# Patient Record
Sex: FEMALE | Race: WHITE | Employment: OTHER | URBAN - METROPOLITAN AREA
[De-identification: names, ages, dates, MRNs, and addresses within clinical notes are randomized per-mention and may not be internally consistent; named-entity substitution may affect disease eponyms.]

---

## 2021-05-25 ENCOUNTER — RECORDS - HEALTHEAST (OUTPATIENT)
Dept: ADMINISTRATIVE | Facility: CLINIC | Age: 81
End: 2021-05-25

## 2022-12-02 ENCOUNTER — TRANSCRIBE ORDERS (OUTPATIENT)
Dept: VASCULAR SURGERY | Facility: CLINIC | Age: 82
End: 2022-12-02

## 2022-12-02 DIAGNOSIS — L97.921 NON-PRESSURE CHRONIC ULCER OF LEFT LOWER LEG, LIMITED TO BREAKDOWN OF SKIN (H): Primary | ICD-10-CM

## 2024-01-03 ENCOUNTER — DOCUMENTATION ONLY (OUTPATIENT)
Dept: GERIATRICS | Facility: CLINIC | Age: 84
End: 2024-01-03
Payer: COMMERCIAL

## 2024-01-03 PROBLEM — E53.8 B12 DEFICIENCY: Status: ACTIVE | Noted: 2020-10-22

## 2024-01-03 PROBLEM — I27.82 OTHER CHRONIC PULMONARY EMBOLISM WITH ACUTE COR PULMONALE (H): Status: ACTIVE | Noted: 2023-01-30

## 2024-01-03 PROBLEM — Z87.2 HISTORY OF ACTINIC KERATOSES: Status: ACTIVE | Noted: 2024-01-03

## 2024-01-03 PROBLEM — I49.9 CARDIAC ARRHYTHMIA: Status: ACTIVE | Noted: 2024-01-03

## 2024-01-03 PROBLEM — R13.10 DYSPHAGIA: Status: ACTIVE | Noted: 2024-01-03

## 2024-01-03 PROBLEM — R06.09 DOE (DYSPNEA ON EXERTION): Status: ACTIVE | Noted: 2023-03-08

## 2024-01-03 PROBLEM — M81.0 OSTEOPOROSIS: Status: ACTIVE | Noted: 2024-01-03

## 2024-01-03 PROBLEM — H18.459: Status: ACTIVE | Noted: 2018-09-19

## 2024-01-03 PROBLEM — I26.09 OTHER CHRONIC PULMONARY EMBOLISM WITH ACUTE COR PULMONALE (H): Status: ACTIVE | Noted: 2023-01-30

## 2024-01-03 PROBLEM — L71.9 ROSACEA: Status: ACTIVE | Noted: 2024-01-03

## 2024-01-03 PROBLEM — R41.89 COGNITIVE IMPAIRMENT: Status: ACTIVE | Noted: 2023-12-27

## 2024-01-03 PROBLEM — F16.988: Status: ACTIVE | Noted: 2023-12-27

## 2024-01-03 PROBLEM — I26.99 PULMONARY EMBOLISM (H): Status: ACTIVE | Noted: 2022-12-12

## 2024-01-03 PROBLEM — G60.8 PERIPHERAL SENSORY NEUROPATHY: Status: ACTIVE | Noted: 2017-09-13

## 2024-01-04 ENCOUNTER — TRANSITIONAL CARE UNIT VISIT (OUTPATIENT)
Dept: GERIATRICS | Facility: CLINIC | Age: 84
End: 2024-01-04
Payer: COMMERCIAL

## 2024-01-04 DIAGNOSIS — S12.390S: ICD-10-CM

## 2024-01-04 DIAGNOSIS — S22.41XD CLOSED FRACTURE OF MULTIPLE RIBS OF RIGHT SIDE WITH ROUTINE HEALING, SUBSEQUENT ENCOUNTER: ICD-10-CM

## 2024-01-04 DIAGNOSIS — F03.90 DEMENTIA WITHOUT BEHAVIORAL DISTURBANCE (H): ICD-10-CM

## 2024-01-04 DIAGNOSIS — Z86.711 HISTORY OF PULMONARY EMBOLISM: ICD-10-CM

## 2024-01-04 DIAGNOSIS — R13.10 DYSPHAGIA, UNSPECIFIED TYPE: ICD-10-CM

## 2024-01-04 DIAGNOSIS — S32.010S CLOSED COMPRESSION FRACTURE OF L1 LUMBAR VERTEBRA, SEQUELA: Primary | ICD-10-CM

## 2024-01-04 PROCEDURE — 99309 SBSQ NF CARE MODERATE MDM 30: CPT | Performed by: NURSE PRACTITIONER

## 2024-01-04 NOTE — LETTER
1/4/2024        RE: Balbina Fischer  [address]  [facility] [address]        [facility]    Code Status:  DNR/DNI   Visit Type:   Chief Complaint   Patient presents with     TCU Admission     Facility:  [facility] ([facility]) [[id number]]         HPI: Balbina Fischer is a 83 year old female who I am seeing today for admit to the TCU. Pt recently hospitalized at [facility] on 12/27/23- 1/3/24.  Past medical history includes PE on apixaban, dementia, urinary continence, osteoporosis, dysphagia, hyperlipidemia with recurrent falls.  Pt admitted status post mechanical fall.  Patient had a ground-level fall outside her primary care office.  She was walking out of the clinic with her  when she fell and hit her head on the wall.  She endorsed back and neck pain.  Patient found to have an acute L1 burst fracture with mild height loss 4 mm retropulsion.  She had a right parietal scalp with swelling with no fracture.  Bilateral rib fractures likely subacute with age-indeterminate right C4 spinous process fracture. Patient with multiple recurrent falls at home.    Transitional Care Course: Today patient lying in bed.  Her  and granddaughter present on exam.  Her  is her primary care giver at home.   reports approximately 30 falls over the last year.  Patient seen by neurosurgery and was placed in [place] collar and [place] brace with spinal precautions.  Incidental finding of no distal esophageal wall thickening seen with esophagitis.  Patient is seen by speech therapy and placed on honey thickened liquids.  Patient complaining of pain in her back writhing in bed.  She is only on Tylenol.  I did discuss with the family scheduling this and they are in agreement.  She does have some underlying cognitive deficit.  Patient denies any shortness of breath or chest pain.  Family reports she is eating well.  She is having regular bowel movements and  voiding.      Assessment/Plan:       Closed compression fracture of L1 lumbar vertebra, sequela  Closed fracture of multiple ribs of right side with routine healing, subsequent encounter  -splinting OOB.   -Increase tylenol to 1000 mg TID.   -Follow up with Neurosurgery in 6 weeks with repeat imaging.     Other closed displaced fracture of fourth cervical vertebra, sequela  -age indeterminate.     Dementia without behavioral disturbance (H)  -continue namenda.     Dysphagia, unspecified type  -OK to ST to eval.  -Regular diet with honey thick liquids.   -incidental diagnosis of esophageal wall thickening.  Recommended barium swallow outpatient.    History of pulmonary embolism  -Continue apixaban.    -Okay for PT OT and speech to eval and treat.  -Follow-up CBC and BMP.      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     Dysphagia 01/03/2024     ESTRADA (dyspnea on exertion) 03/08/2023     Osteoporosis 01/03/2024     Macular pucker, left eye 08/05/2013     Hyperlipidemia 09/19/2014     Pulmonary embolism (H) 12/12/2022     Other chronic pulmonary embolism with acute cor pulmonale (H) 01/30/2023     Peripheral sensory neuropathy 09/13/2017     Paresthesia 05/13/2014     Right sided sciatica 08/05/2013     Rosacea 01/03/2024     Resolved Ambulatory Problems     Diagnosis Date Noted     No Resolved Ambulatory Problems     No Additional Past Medical History     Allergies   Allergen Reactions     Ibandronic Acid Other (See Comments)     Jaw osteonecrosis       All Meds and Allergies reviewed in the record at the facility and is the most up-to-date.    Post Discharge Medication Reconciliation Status: discharge medications reconciled and  changed, per note/orders  Current Outpatient Medications   Medication Sig     acetaminophen (TYLENOL) 500 MG tablet Take 1,000 mg by mouth 3 times daily     apixaban ANTICOAGULANT (ELIQUIS) 5 MG tablet Take 10 mg by mouth 2 times daily 10 mg BID X 12 doses, then 5 mg BID X 3 months.     melatonin 3 MG tablet Take 3 mg by mouth at bedtime     memantine (NAMENDA) 5 MG tablet Take 5 mg by mouth 2 times daily     No current facility-administered medications for this visit.       REVIEW OF SYSTEMS:   10 point review of systems reviewed and pertinent positives in the HPI.     PHYSICAL EXAMINATION:  Physical Exam     Vital signs: /64, pulse 79, respirations 18, temperature 98.4, O2 sat 95% on room air.    General: Awake, Alert, oriented x3, lying in bed follows simple commands at times.  HEENT: Keeps eyes closed and opens twice during visit.   NECK: Supple  CVS:  S1  S2, without murmur or gallop.   LUNG: Clear to auscultation, No wheezes, rales or rhonci.  BACK: No kyphosis of the thoracic spine.  Splint on.  ABDOMEN: Soft, thin, with positive bowel sounds  EXTREMITIES: Curled in bed.  No lower extremity edema.  SKIN: Warm and dry  NEUROLOGIC: Intact, pulses palpable  PSYCHIATRIC: Cognitive impairment noted.    35 minutes of this visit which included.  For the visit, reviewing neurosurgery consult, imaging, labs and medications as well as face-to-face time spent with patient and her family.      This note has been dictated using voice recognition software. Any grammatical or context distortions are unintentional and inherent to the software    Electronically signed by: Nicki Calderon CNP       Sincerely,        Nicki Calderon NP

## 2024-01-05 RX ORDER — MEMANTINE HYDROCHLORIDE 5 MG/1
5 TABLET ORAL 2 TIMES DAILY
COMMUNITY

## 2024-01-05 RX ORDER — ACETAMINOPHEN 500 MG
1000 TABLET ORAL 3 TIMES DAILY
COMMUNITY

## 2024-01-05 RX ORDER — LANOLIN ALCOHOL/MO/W.PET/CERES
3 CREAM (GRAM) TOPICAL AT BEDTIME
COMMUNITY

## 2024-01-05 NOTE — PROGRESS NOTES
[facility]    Code Status:  DNR/DNI   Visit Type:   Chief Complaint   Patient presents with    TCU Admission     Facility:  [facility] ([facility]) [[id number]]         HPI: Balbina Fischer is a 83 year old female who I am seeing today for admit to the TCU. Pt recently hospitalized at [facility] on 12/27/23- 1/3/24.  Past medical history includes PE on apixaban, dementia, urinary continence, osteoporosis, dysphagia, hyperlipidemia with recurrent falls.  Pt admitted status post mechanical fall.  Patient had a ground-level fall outside her primary care office.  She was walking out of the clinic with her  when she fell and hit her head on the wall.  She endorsed back and neck pain.  Patient found to have an acute L1 burst fracture with mild height loss 4 mm retropulsion.  She had a right parietal scalp with swelling with no fracture.  Bilateral rib fractures likely subacute with age-indeterminate right C4 spinous process fracture. Patient with multiple recurrent falls at home.    Transitional Care Course: Today patient lying in bed.  Her  and granddaughter present on exam.  Her  is her primary care giver at home.   reports approximately 30 falls over the last year.  Patient seen by neurosurgery and was placed in [place] collar and [place] brace with spinal precautions.  Incidental finding of no distal esophageal wall thickening seen with esophagitis.  Patient is seen by speech therapy and placed on honey thickened liquids.  Patient complaining of pain in her back writhing in bed.  She is only on Tylenol.  I did discuss with the family scheduling this and they are in agreement.  She does have some underlying cognitive deficit.  Patient denies any shortness of breath or chest pain.  Family reports she is eating well.  She is having regular bowel movements and voiding.      Assessment/Plan:       Closed compression fracture of L1 lumbar vertebra, sequela  Closed fracture  of multiple ribs of right side with routine healing, subsequent encounter  -splinting OOB.   -Increase tylenol to 1000 mg TID.   -Follow up with Neurosurgery in 6 weeks with repeat imaging.     Other closed displaced fracture of fourth cervical vertebra, sequela  -age indeterminate.     Dementia without behavioral disturbance (H)  -continue namenda.     Dysphagia, unspecified type  -OK to ST to eval.  -Regular diet with honey thick liquids.   -incidental diagnosis of esophageal wall thickening.  Recommended barium swallow outpatient.    History of pulmonary embolism  -Continue apixaban.    -Okay for PT OT and speech to eval and treat.  -Follow-up CBC and BMP.      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    Dysphagia 01/03/2024    ESTRADA (dyspnea on exertion) 03/08/2023    Osteoporosis 01/03/2024    Macular pucker, left eye 08/05/2013    Hyperlipidemia 09/19/2014    Pulmonary embolism (H) 12/12/2022    Other chronic pulmonary embolism with acute cor pulmonale (H) 01/30/2023    Peripheral sensory neuropathy 09/13/2017    Paresthesia 05/13/2014    Right sided sciatica 08/05/2013    Rosacea 01/03/2024     Resolved Ambulatory Problems     Diagnosis Date Noted    No Resolved Ambulatory Problems     No Additional Past Medical History     Allergies   Allergen Reactions    Ibandronic Acid Other (See Comments)     Jaw osteonecrosis       All Meds and Allergies reviewed in the record at the facility and is the most up-to-date.    Post Discharge Medication Reconciliation Status: discharge medications reconciled and changed, per note/orders  Current Outpatient Medications   Medication Sig    acetaminophen (TYLENOL) 500 MG tablet Take 1,000 mg by  mouth 3 times daily    apixaban ANTICOAGULANT (ELIQUIS) 5 MG tablet Take 10 mg by mouth 2 times daily 10 mg BID X 12 doses, then 5 mg BID X 3 months.    melatonin 3 MG tablet Take 3 mg by mouth at bedtime    memantine (NAMENDA) 5 MG tablet Take 5 mg by mouth 2 times daily     No current facility-administered medications for this visit.       REVIEW OF SYSTEMS:   10 point review of systems reviewed and pertinent positives in the HPI.     PHYSICAL EXAMINATION:  Physical Exam     Vital signs: /64, pulse 79, respirations 18, temperature 98.4, O2 sat 95% on room air.    General: Awake, Alert, oriented x3, lying in bed follows simple commands at times.  HEENT: Keeps eyes closed and opens twice during visit.   NECK: Supple  CVS:  S1  S2, without murmur or gallop.   LUNG: Clear to auscultation, No wheezes, rales or rhonci.  BACK: No kyphosis of the thoracic spine.  Splint on.  ABDOMEN: Soft, thin, with positive bowel sounds  EXTREMITIES: Curled in bed.  No lower extremity edema.  SKIN: Warm and dry  NEUROLOGIC: Intact, pulses palpable  PSYCHIATRIC: Cognitive impairment noted.    35 minutes of this visit which included.  For the visit, reviewing neurosurgery consult, imaging, labs and medications as well as face-to-face time spent with patient and her family.      This note has been dictated using voice recognition software. Any grammatical or context distortions are unintentional and inherent to the software    Electronically signed by: Nicki Calderon CNP

## 2024-01-06 ENCOUNTER — LAB REQUISITION (OUTPATIENT)
Dept: LAB | Facility: CLINIC | Age: 84
End: 2024-01-06
Payer: COMMERCIAL

## 2024-01-06 DIAGNOSIS — S32.010D WEDGE COMPRESSION FRACTURE OF FIRST LUMBAR VERTEBRA, SUBSEQUENT ENCOUNTER FOR FRACTURE WITH ROUTINE HEALING: ICD-10-CM

## 2024-01-08 LAB
ANION GAP SERPL CALCULATED.3IONS-SCNC: 10 MMOL/L (ref 7–15)
BUN SERPL-MCNC: 19.5 MG/DL (ref 8–23)
CALCIUM SERPL-MCNC: 9.2 MG/DL (ref 8.8–10.2)
CHLORIDE SERPL-SCNC: 106 MMOL/L (ref 98–107)
CREAT SERPL-MCNC: 0.76 MG/DL (ref 0.51–0.95)
DEPRECATED HCO3 PLAS-SCNC: 26 MMOL/L (ref 22–29)
EGFRCR SERPLBLD CKD-EPI 2021: 78 ML/MIN/1.73M2
ERYTHROCYTE [DISTWIDTH] IN BLOOD BY AUTOMATED COUNT: 14.2 % (ref 10–15)
GLUCOSE SERPL-MCNC: 89 MG/DL (ref 70–99)
HCT VFR BLD AUTO: 34.4 % (ref 35–47)
HGB BLD-MCNC: 10.6 G/DL (ref 11.7–15.7)
MCH RBC QN AUTO: 29.9 PG (ref 26.5–33)
MCHC RBC AUTO-ENTMCNC: 30.8 G/DL (ref 31.5–36.5)
MCV RBC AUTO: 97 FL (ref 78–100)
PLATELET # BLD AUTO: 413 10E3/UL (ref 150–450)
POTASSIUM SERPL-SCNC: 4.5 MMOL/L (ref 3.4–5.3)
RBC # BLD AUTO: 3.54 10E6/UL (ref 3.8–5.2)
SODIUM SERPL-SCNC: 142 MMOL/L (ref 135–145)
WBC # BLD AUTO: 8.1 10E3/UL (ref 4–11)

## 2024-01-08 PROCEDURE — 80048 BASIC METABOLIC PNL TOTAL CA: CPT | Mod: ORL | Performed by: FAMILY MEDICINE

## 2024-01-08 PROCEDURE — 85027 COMPLETE CBC AUTOMATED: CPT | Mod: ORL | Performed by: FAMILY MEDICINE

## 2024-01-08 PROCEDURE — P9604 ONE-WAY ALLOW PRORATED TRIP: HCPCS | Mod: ORL | Performed by: FAMILY MEDICINE

## 2024-01-08 PROCEDURE — 36415 COLL VENOUS BLD VENIPUNCTURE: CPT | Mod: ORL | Performed by: FAMILY MEDICINE

## 2024-01-09 ENCOUNTER — TRANSITIONAL CARE UNIT VISIT (OUTPATIENT)
Dept: GERIATRICS | Facility: CLINIC | Age: 84
End: 2024-01-09
Payer: COMMERCIAL

## 2024-01-09 VITALS
HEIGHT: 61 IN | RESPIRATION RATE: 16 BRPM | DIASTOLIC BLOOD PRESSURE: 72 MMHG | OXYGEN SATURATION: 96 % | WEIGHT: 103 LBS | BODY MASS INDEX: 19.45 KG/M2 | SYSTOLIC BLOOD PRESSURE: 113 MMHG | TEMPERATURE: 98.2 F | HEART RATE: 96 BPM

## 2024-01-09 DIAGNOSIS — S22.41XD CLOSED FRACTURE OF MULTIPLE RIBS OF RIGHT SIDE WITH ROUTINE HEALING, SUBSEQUENT ENCOUNTER: ICD-10-CM

## 2024-01-09 DIAGNOSIS — F03.90 DEMENTIA WITHOUT BEHAVIORAL DISTURBANCE (H): ICD-10-CM

## 2024-01-09 DIAGNOSIS — S12.390S: ICD-10-CM

## 2024-01-09 DIAGNOSIS — S32.010S CLOSED COMPRESSION FRACTURE OF L1 LUMBAR VERTEBRA, SEQUELA: Primary | ICD-10-CM

## 2024-01-09 DIAGNOSIS — Z86.711 HISTORY OF PULMONARY EMBOLISM: ICD-10-CM

## 2024-01-09 PROCEDURE — 99309 SBSQ NF CARE MODERATE MDM 30: CPT | Performed by: NURSE PRACTITIONER

## 2024-01-09 NOTE — LETTER
1/9/2024        RE: Balbina Fischer  [address]  [facility] [address]        [facility]    Code Status:  DNR/DNI   Visit Type:   Chief Complaint   Patient presents with     TCU Follow Up     Facility:  [facility] ([facility]) [[id number]]         HPI: Balbina Fischer is a 83 year old female who I am seeing today for follow up on the TCU. Pt recently hospitalized at [facility] on 12/27/23- 1/3/24.  Past medical history includes PE on apixaban, dementia, urinary continence, osteoporosis, dysphagia, hyperlipidemia with recurrent falls.  Pt admitted status post mechanical fall.  Patient had a ground-level fall outside her primary care office.  She was walking out of the clinic with her  when she fell and hit her head on the wall.  She endorsed back and neck pain.  Patient found to have an acute L1 burst fracture with mild height loss 4 mm retropulsion.  She had a right parietal scalp with swelling with no fracture.  Bilateral rib fractures likely subacute with age-indeterminate right C4 spinous process fracture. Patient with multiple recurrent falls at home.    Transitional Care Course: Today patient lying in bed.  L1 burst fx. Patient continues in splinting. Pain controlled with tylenol. Dysphagia. Patient is seen by speech therapy and placed on honey thickened liquids.   Patient denies any shortness of breath or chest pain.  Family reports she is eating well.  She is having regular bowel movements and voiding.       Assessment/Plan:       Closed compression fracture of L1 lumbar vertebra, sequela  Closed fracture of multiple ribs of right side with routine healing, subsequent encounter  -splinting OOB.   -tylenol to 1000 mg TID.   -Follow up with Neurosurgery in 6 weeks with repeat imaging.     Other closed displaced fracture of fourth cervical vertebra, sequela  -age indeterminate.     Dementia without behavioral disturbance (H)  -continue namenda.     Dysphagia,  unspecified type  -ST following.   -Regular diet with honey thick liquids.   -incidental diagnosis of esophageal wall thickening.  Recommended barium swallow outpatient.    History of pulmonary embolism  -Continue apixaban.    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     Dysphagia 01/03/2024     ESTRADA (dyspnea on exertion) 03/08/2023     Osteoporosis 01/03/2024     Macular pucker, left eye 08/05/2013     Hyperlipidemia 09/19/2014     Pulmonary embolism (H) 12/12/2022     Other chronic pulmonary embolism with acute cor pulmonale (H) 01/30/2023     Peripheral sensory neuropathy 09/13/2017     Paresthesia 05/13/2014     Right sided sciatica 08/05/2013     Rosacea 01/03/2024     Resolved Ambulatory Problems     Diagnosis Date Noted     No Resolved Ambulatory Problems     No Additional Past Medical History     Allergies   Allergen Reactions     Ibandronic Acid Other (See Comments)     Jaw osteonecrosis       All Meds and Allergies reviewed in the record at the facility and is the most up-to-date.    Current Outpatient Medications   Medication Sig     acetaminophen (TYLENOL) 500 MG tablet Take 1,000 mg by mouth 3 times daily     apixaban ANTICOAGULANT (ELIQUIS) 5 MG tablet Take 10 mg by mouth 2 times daily 10 mg BID X 12 doses, then 5 mg BID X 3 months.     melatonin 3 MG tablet Take 3 mg by mouth at bedtime     memantine (NAMENDA) 5 MG tablet Take 5 mg by mouth 2 times daily     No current facility-administered medications for this visit.       REVIEW OF SYSTEMS:   10 point review of systems reviewed and pertinent positives in the HPI.     PHYSICAL EXAMINATION:  Physical Exam     Vital signs: /64, pulse 79, respirations 18,  temperature 98.4, O2 sat 95% on room air.    General: Awake, Alert, oriented x3, lying in bed follows simple commands at times.  HEENT: Awake, alert lying in be.   NECK: Supple  CVS:  S1  S2, without murmur or gallop.   LUNG: Shallow respiratory effort.   BACK: No kyphosis of the thoracic spine.  ABDOMEN: Soft, thin, with positive bowel sounds  EXTREMITIES: Moves all extremities with generalized weakness.  No lower extremity edema.  SKIN: Warm and dry  NEUROLOGIC: Intact  PSYCHIATRIC: Cognitive impairment noted.    This note has been dictated using voice recognition software. Any grammatical or context distortions are unintentional and inherent to the software    Electronically signed by: Nicki Calderon CNP       Sincerely,        Nicki Calderon NP

## 2024-01-10 NOTE — PROGRESS NOTES
[facility]    Code Status:  DNR/DNI   Visit Type:   Chief Complaint   Patient presents with    TCU Follow Up     Facility:  [facility] ([facility]) [[id number]]         HPI: Balbina Fischer is a 83 year old female who I am seeing today for follow up on the TCU. Pt recently hospitalized at [facility] on 12/27/23- 1/3/24.  Past medical history includes PE on apixaban, dementia, urinary continence, osteoporosis, dysphagia, hyperlipidemia with recurrent falls.  Pt admitted status post mechanical fall.  Patient had a ground-level fall outside her primary care office.  She was walking out of the clinic with her  when she fell and hit her head on the wall.  She endorsed back and neck pain.  Patient found to have an acute L1 burst fracture with mild height loss 4 mm retropulsion.  She had a right parietal scalp with swelling with no fracture.  Bilateral rib fractures likely subacute with age-indeterminate right C4 spinous process fracture. Patient with multiple recurrent falls at home.    Transitional Care Course: Today patient lying in bed.  L1 burst fx. Patient continues in splinting. Pain controlled with tylenol. Dysphagia. Patient is seen by speech therapy and placed on honey thickened liquids.   Patient denies any shortness of breath or chest pain.  Family reports she is eating well.  She is having regular bowel movements and voiding.       Assessment/Plan:       Closed compression fracture of L1 lumbar vertebra, sequela  Closed fracture of multiple ribs of right side with routine healing, subsequent encounter  -splinting OOB.   -tylenol to 1000 mg TID.   -Follow up with Neurosurgery in 6 weeks with repeat imaging.     Other closed displaced fracture of fourth cervical vertebra, sequela  -age indeterminate.     Dementia without behavioral disturbance (H)  -continue namenda.     Dysphagia, unspecified type  -ST following.   -Regular diet with honey thick liquids.   -incidental diagnosis  of esophageal wall thickening.  Recommended barium swallow outpatient.    History of pulmonary embolism  -Continue apixaban.    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    Dysphagia 01/03/2024    ESTRADA (dyspnea on exertion) 03/08/2023    Osteoporosis 01/03/2024    Macular pucker, left eye 08/05/2013    Hyperlipidemia 09/19/2014    Pulmonary embolism (H) 12/12/2022    Other chronic pulmonary embolism with acute cor pulmonale (H) 01/30/2023    Peripheral sensory neuropathy 09/13/2017    Paresthesia 05/13/2014    Right sided sciatica 08/05/2013    Rosacea 01/03/2024     Resolved Ambulatory Problems     Diagnosis Date Noted    No Resolved Ambulatory Problems     No Additional Past Medical History     Allergies   Allergen Reactions    Ibandronic Acid Other (See Comments)     Jaw osteonecrosis       All Meds and Allergies reviewed in the record at the facility and is the most up-to-date.    Current Outpatient Medications   Medication Sig    acetaminophen (TYLENOL) 500 MG tablet Take 1,000 mg by mouth 3 times daily    apixaban ANTICOAGULANT (ELIQUIS) 5 MG tablet Take 10 mg by mouth 2 times daily 10 mg BID X 12 doses, then 5 mg BID X 3 months.    melatonin 3 MG tablet Take 3 mg by mouth at bedtime    memantine (NAMENDA) 5 MG tablet Take 5 mg by mouth 2 times daily     No current facility-administered medications for this visit.       REVIEW OF SYSTEMS:   10 point review of systems reviewed and pertinent positives in the HPI.     PHYSICAL EXAMINATION:  Physical Exam     Vital signs: /64, pulse 79, respirations 18, temperature 98.4, O2 sat 95% on room air.    General: Awake, Alert, oriented x3, lying in bed follows simple commands at  times.  HEENT: Awake, alert lying in be.   NECK: Supple  CVS:  S1  S2, without murmur or gallop.   LUNG: Shallow respiratory effort.   BACK: No kyphosis of the thoracic spine.  ABDOMEN: Soft, thin, with positive bowel sounds  EXTREMITIES: Moves all extremities with generalized weakness.  No lower extremity edema.  SKIN: Warm and dry  NEUROLOGIC: Intact  PSYCHIATRIC: Cognitive impairment noted.    This note has been dictated using voice recognition software. Any grammatical or context distortions are unintentional and inherent to the software    Electronically signed by: Nicki Calderon, CNP

## 2024-01-11 ENCOUNTER — TRANSITIONAL CARE UNIT VISIT (OUTPATIENT)
Dept: GERIATRICS | Facility: CLINIC | Age: 84
End: 2024-01-11
Payer: COMMERCIAL

## 2024-01-11 VITALS
HEART RATE: 86 BPM | BODY MASS INDEX: 19.52 KG/M2 | WEIGHT: 103.4 LBS | HEIGHT: 61 IN | RESPIRATION RATE: 18 BRPM | TEMPERATURE: 98 F | OXYGEN SATURATION: 97 % | DIASTOLIC BLOOD PRESSURE: 67 MMHG | SYSTOLIC BLOOD PRESSURE: 117 MMHG

## 2024-01-11 DIAGNOSIS — Z86.711 HISTORY OF PULMONARY EMBOLISM: ICD-10-CM

## 2024-01-11 DIAGNOSIS — F03.90 DEMENTIA WITHOUT BEHAVIORAL DISTURBANCE (H): ICD-10-CM

## 2024-01-11 DIAGNOSIS — S12.390S: ICD-10-CM

## 2024-01-11 DIAGNOSIS — R13.10 DYSPHAGIA, UNSPECIFIED TYPE: ICD-10-CM

## 2024-01-11 DIAGNOSIS — S22.41XD CLOSED FRACTURE OF MULTIPLE RIBS OF RIGHT SIDE WITH ROUTINE HEALING, SUBSEQUENT ENCOUNTER: ICD-10-CM

## 2024-01-11 DIAGNOSIS — S32.010S CLOSED COMPRESSION FRACTURE OF L1 LUMBAR VERTEBRA, SEQUELA: Primary | ICD-10-CM
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
## 2024-01-11 PROBLEM — R32 URINARY INCONTINENCE: Status: ACTIVE | Noted: 2023-12-28

## 2024-01-11 PROBLEM — I82.419 ACUTE DEEP VEIN THROMBOSIS (DVT) OF FEMORAL VEIN (H): Status: ACTIVE | Noted: 2024-01-01

## 2024-01-11 PROBLEM — Z87.81 HISTORY OF RIB FRACTURE: Status: ACTIVE | Noted: 2023-12-28

## 2024-01-11 PROBLEM — Z91.81 HX OF FALLING: Status: ACTIVE | Noted: 2023-12-28

## 2024-01-11 PROBLEM — K59.09 CHRONIC CONSTIPATION: Status: ACTIVE | Noted: 2023-12-31

## 2024-01-11 PROBLEM — S32.010A CLOSED COMPRESSION FRACTURE OF BODY OF L1 VERTEBRA (H): Status: ACTIVE | Noted: 2023-12-28

## 2024-01-11 PROBLEM — D64.9 ANEMIA: Status: ACTIVE | Noted: 2023-12-28

## 2024-01-11 PROCEDURE — 99309 SBSQ NF CARE MODERATE MDM 30: CPT | Performed by: NURSE PRACTITIONER

## 2024-01-11 NOTE — LETTER
1/11/2024        RE: Balbina Fischer  [address]  [facility] [address]        [facility]    Code Status:  DNR/DNI   Visit Type:   Chief Complaint   Patient presents with     TCU Follow Up     Facility:  [facility] ([facility]) [[id number]]         HPI: Balbina Fischer is a 83 year old female who I am seeing today for follow up on the TCU. Pt recently hospitalized at [facility] on 12/27/23- 1/3/24.  Past medical history includes PE on apixaban, dementia, urinary continence, osteoporosis, dysphagia, hyperlipidemia with recurrent falls.  Pt admitted status post mechanical fall.  Patient had a ground-level fall outside her primary care office.  She was walking out of the clinic with her  when she fell and hit her head on the wall.  She endorsed back and neck pain.  Patient found to have an acute L1 burst fracture with mild height loss 4 mm retropulsion.  She had a right parietal scalp with swelling with no fracture.  Bilateral rib fractures likely subacute with age-indeterminate right C4 spinous process fracture. Patient with multiple recurrent falls at home.    Transitional Care Course: Today patient lying in bed.  Her  is present on exam.  L1 burst fx. Patient continues in splinting. Pain controlled with tylenol.  Nursing staff expressed concerns over increased sleepiness however very alert and awake on visit.  Neurologically intact.  Answer questions appropriately.  Dysphagia. Patient is seen by speech therapy and placed on honey thickened liquids.   reports patient has a very good appetite.  Patient denies any shortness of breath or chest pain.        Assessment/Plan:       Closed compression fracture of L1 lumbar vertebra, sequela  Closed fracture of multiple ribs of right side with routine healing, subsequent encounter  -splinting OOB.   -tylenol to 1000 mg TID.   -Follow up with Neurosurgery in 5 weeks with repeat imaging.     Other closed displaced  fracture of fourth cervical vertebra, sequela  -age indeterminate.     Dementia without behavioral disturbance (H)  -continue namenda.     Dysphagia, unspecified type  -ST following.   -Regular diet with honey thick liquids.   -incidental diagnosis of esophageal wall thickening.  Recommended barium swallow outpatient.    History of pulmonary embolism  -Continue apixaban.    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     Dysphagia 01/03/2024     ESTRADA (dyspnea on exertion) 03/08/2023     Osteoporosis 01/03/2024     Macular pucker, left eye 08/05/2013     Hyperlipidemia 09/19/2014     Pulmonary embolism (H) 12/12/2022     Other chronic pulmonary embolism with acute cor pulmonale (H) 01/30/2023     Peripheral sensory neuropathy 09/13/2017     Paresthesia 05/13/2014     Right sided sciatica 08/05/2013     Rosacea 01/03/2024     Urinary incontinence 12/28/2023     Hx of falling 12/28/2023     History of rib fracture 12/28/2023     History of pulmonary embolism 12/28/2023     Ground-level fall 12/28/2023     Closed compression fracture of body of L1 vertebra (H) 12/28/2023     Chronic constipation 12/31/2023     Anemia 12/28/2023     Acute deep vein thrombosis (DVT) of femoral vein (H) 01/01/2024     Resolved Ambulatory Problems     Diagnosis Date Noted     No Resolved Ambulatory Problems     No Additional Past Medical History     Allergies   Allergen Reactions     Ibandronic Acid Other (See Comments)     Jaw osteonecrosis       All Meds and Allergies reviewed in the record at the facility and is the most up-to-date.    Current Outpatient Medications   Medication Sig     acetaminophen (TYLENOL) 500 MG tablet Take 1,000 mg by mouth 3  times daily     apixaban ANTICOAGULANT (ELIQUIS) 5 MG tablet Take 10 mg by mouth 2 times daily 10 mg BID X 12 doses, then 5 mg BID X 3 months.     melatonin 3 MG tablet Take 3 mg by mouth at bedtime     memantine (NAMENDA) 5 MG tablet Take 5 mg by mouth 2 times daily     No current facility-administered medications for this visit.       REVIEW OF SYSTEMS:   10 point review of systems reviewed and pertinent positives in the HPI.     PHYSICAL EXAMINATION:  Physical Exam     Vital signs: /64, pulse 79, respirations 18, temperature 98.4, O2 sat 95% on room air.    General: Awake, Alert, oriented x3, lying in bed, conversant.  HEENT: Moist oral mucosa.  NECK: Supple  CVS:  S1  S2, without murmur or gallop.   LUNG: Shallow respiratory effort.   BACK: No kyphosis of the thoracic spine.  Brace on.  ABDOMEN: Soft, thin, with positive bowel sounds  EXTREMITIES: Moves all extremities with generalized weakness.  No lower extremity edema.  SKIN: Warm and dry  NEUROLOGIC: Intact  PSYCHIATRIC: Cognitive impairment noted.    This note has been dictated using voice recognition software. Any grammatical or context distortions are unintentional and inherent to the software    Electronically signed by: Nicki Calderon CNP       Sincerely,        Nicki Calderon NP

## 2024-01-12 NOTE — PROGRESS NOTES
[facility]    Code Status:  DNR/DNI   Visit Type:   Chief Complaint   Patient presents with    TCU Follow Up     Facility:  [facility] ([facility]) [[id number]]         HPI: Balbina Fischer is a 83 year old female who I am seeing today for follow up on the TCU. Pt recently hospitalized at [facility] on 12/27/23- 1/3/24.  Past medical history includes PE on apixaban, dementia, urinary continence, osteoporosis, dysphagia, hyperlipidemia with recurrent falls.  Pt admitted status post mechanical fall.  Patient had a ground-level fall outside her primary care office.  She was walking out of the clinic with her  when she fell and hit her head on the wall.  She endorsed back and neck pain.  Patient found to have an acute L1 burst fracture with mild height loss 4 mm retropulsion.  She had a right parietal scalp with swelling with no fracture.  Bilateral rib fractures likely subacute with age-indeterminate right C4 spinous process fracture. Patient with multiple recurrent falls at home.    Transitional Care Course: Today patient lying in bed.  Her  is present on exam.  L1 burst fx. Patient continues in splinting. Pain controlled with tylenol.  Nursing staff expressed concerns over increased sleepiness however very alert and awake on visit.  Neurologically intact.  Answer questions appropriately.  Dysphagia. Patient is seen by speech therapy and placed on honey thickened liquids.   reports patient has a very good appetite.  Patient denies any shortness of breath or chest pain.        Assessment/Plan:       Closed compression fracture of L1 lumbar vertebra, sequela  Closed fracture of multiple ribs of right side with routine healing, subsequent encounter  -splinting OOB.   -tylenol to 1000 mg TID.   -Follow up with Neurosurgery in 5 weeks with repeat imaging.     Other closed displaced fracture of fourth cervical vertebra, sequela  -age indeterminate.     Dementia without  behavioral disturbance (H)  -continue namenda.     Dysphagia, unspecified type  -ST following.   -Regular diet with honey thick liquids.   -incidental diagnosis of esophageal wall thickening.  Recommended barium swallow outpatient.    History of pulmonary embolism  -Continue apixaban.    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    Dysphagia 01/03/2024    ESTRADA (dyspnea on exertion) 03/08/2023    Osteoporosis 01/03/2024    Macular pucker, left eye 08/05/2013    Hyperlipidemia 09/19/2014    Pulmonary embolism (H) 12/12/2022    Other chronic pulmonary embolism with acute cor pulmonale (H) 01/30/2023    Peripheral sensory neuropathy 09/13/2017    Paresthesia 05/13/2014    Right sided sciatica 08/05/2013    Rosacea 01/03/2024    Urinary incontinence 12/28/2023    Hx of falling 12/28/2023    History of rib fracture 12/28/2023    History of pulmonary embolism 12/28/2023    Ground-level fall 12/28/2023    Closed compression fracture of body of L1 vertebra (H) 12/28/2023    Chronic constipation 12/31/2023    Anemia 12/28/2023    Acute deep vein thrombosis (DVT) of femoral vein (H) 01/01/2024     Resolved Ambulatory Problems     Diagnosis Date Noted    No Resolved Ambulatory Problems     No Additional Past Medical History     Allergies   Allergen Reactions    Ibandronic Acid Other (See Comments)     Jaw osteonecrosis       All Meds and Allergies reviewed in the record at the facility and is the most up-to-date.    Current Outpatient Medications   Medication Sig    acetaminophen (TYLENOL) 500 MG tablet Take 1,000 mg by mouth 3 times daily    apixaban ANTICOAGULANT (ELIQUIS) 5 MG tablet Take 10 mg by mouth 2 times daily 10 mg BID X 12 doses, then  5 mg BID X 3 months.    melatonin 3 MG tablet Take 3 mg by mouth at bedtime    memantine (NAMENDA) 5 MG tablet Take 5 mg by mouth 2 times daily     No current facility-administered medications for this visit.       REVIEW OF SYSTEMS:   10 point review of systems reviewed and pertinent positives in the HPI.     PHYSICAL EXAMINATION:  Physical Exam     Vital signs: /64, pulse 79, respirations 18, temperature 98.4, O2 sat 95% on room air.    General: Awake, Alert, oriented x3, lying in bed, conversant.  HEENT: Moist oral mucosa.  NECK: Supple  CVS:  S1  S2, without murmur or gallop.   LUNG: Shallow respiratory effort.   BACK: No kyphosis of the thoracic spine.  Brace on.  ABDOMEN: Soft, thin, with positive bowel sounds  EXTREMITIES: Moves all extremities with generalized weakness.  No lower extremity edema.  SKIN: Warm and dry  NEUROLOGIC: Intact  PSYCHIATRIC: Cognitive impairment noted.    This note has been dictated using voice recognition software. Any grammatical or context distortions are unintentional and inherent to the software    Electronically signed by: Nicki Calderon CNP

## 2024-01-16 ENCOUNTER — TRANSITIONAL CARE UNIT VISIT (OUTPATIENT)
Dept: GERIATRICS | Facility: CLINIC | Age: 84
End: 2024-01-16
Payer: COMMERCIAL

## 2024-01-16 VITALS
HEART RATE: 99 BPM | BODY MASS INDEX: 19.67 KG/M2 | WEIGHT: 104.2 LBS | TEMPERATURE: 98.2 F | RESPIRATION RATE: 20 BRPM | SYSTOLIC BLOOD PRESSURE: 111 MMHG | HEIGHT: 61 IN | OXYGEN SATURATION: 96 % | DIASTOLIC BLOOD PRESSURE: 78 MMHG

## 2024-01-16 DIAGNOSIS — Z86.711 HISTORY OF PULMONARY EMBOLISM: ICD-10-CM

## 2024-01-16 DIAGNOSIS — F03.90 DEMENTIA WITHOUT BEHAVIORAL DISTURBANCE (H): ICD-10-CM

## 2024-01-16 DIAGNOSIS — S32.010S CLOSED COMPRESSION FRACTURE OF L1 LUMBAR VERTEBRA, SEQUELA: Primary | ICD-10-CM

## 2024-01-16 DIAGNOSIS — S22.41XD CLOSED FRACTURE OF MULTIPLE RIBS OF RIGHT SIDE WITH ROUTINE HEALING, SUBSEQUENT ENCOUNTER: ICD-10-CM

## 2024-01-16 DIAGNOSIS — S12.390S: ICD-10-CM

## 2024-01-16 PROCEDURE — 99309 SBSQ NF CARE MODERATE MDM 30: CPT | Performed by: NURSE PRACTITIONER

## 2024-01-16 NOTE — LETTER
1/16/2024        RE: Balbina Fischer  [address]  [facility] [address]        [facility]    Code Status:  DNR/DNI   Visit Type:   Chief Complaint   Patient presents with     TCU Follow Up     Facility:  [facility] ([facility]) [[id number]]         HPI: Balbina Fischer is a 83 year old female who I am seeing today for follow up on the TCU. Pt recently hospitalized at [facility] on 12/27/23- 1/3/24.  Past medical history includes PE on apixaban, dementia, urinary continence, osteoporosis, dysphagia, hyperlipidemia with recurrent falls.  Pt admitted status post mechanical fall.  Patient had a ground-level fall outside her primary care office.  She was walking out of the clinic with her  when she fell and hit her head on the wall.  She endorsed back and neck pain.  Patient found to have an acute L1 burst fracture with mild height loss 4 mm retropulsion.  She had a right parietal scalp with swelling with no fracture.  Bilateral rib fractures likely subacute with age-indeterminate right C4 spinous process fracture. Patient with multiple recurrent falls at home.    Transitional Care Course: Today patient lying in bed. Her  is present on exam.  L1 burst fx. pain control with Tylenol.  Patient moving better.  Denies any neck pain.  Patient eating well.  No shortness breath or chest pain.       Assessment/Plan:       Closed compression fracture of L1 lumbar vertebra, sequela  Closed fracture of multiple ribs of right side with routine healing, subsequent encounter  -splinting OOB.   -tylenol to 1000 mg TID.   -Follow up with Neurosurgery in 5 weeks with repeat imaging.     Other closed displaced fracture of fourth cervical vertebra, sequela  -age indeterminate.     Dementia without behavioral disturbance (H)  -continue namenda.     Dysphagia, unspecified type  -ST following.   -Regular diet with honey thick liquids.   -incidental diagnosis of esophageal wall thickening.   Recommended barium swallow outpatient.    History of pulmonary embolism  -Continue apixaban.    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     Dysphagia 01/03/2024     ESTRADA (dyspnea on exertion) 03/08/2023     Osteoporosis 01/03/2024     Macular pucker, left eye 08/05/2013     Hyperlipidemia 09/19/2014     Pulmonary embolism (H) 12/12/2022     Other chronic pulmonary embolism with acute cor pulmonale (H) 01/30/2023     Peripheral sensory neuropathy 09/13/2017     Paresthesia 05/13/2014     Right sided sciatica 08/05/2013     Rosacea 01/03/2024     Urinary incontinence 12/28/2023     Hx of falling 12/28/2023     History of rib fracture 12/28/2023     History of pulmonary embolism 12/28/2023     Ground-level fall 12/28/2023     Closed compression fracture of body of L1 vertebra (H) 12/28/2023     Chronic constipation 12/31/2023     Anemia 12/28/2023     Acute deep vein thrombosis (DVT) of femoral vein (H) 01/01/2024     Resolved Ambulatory Problems     Diagnosis Date Noted     No Resolved Ambulatory Problems     No Additional Past Medical History     Allergies   Allergen Reactions     Ibandronic Acid Other (See Comments)     Jaw osteonecrosis       All Meds and Allergies reviewed in the record at the facility and is the most up-to-date.    Current Outpatient Medications   Medication Sig     acetaminophen (TYLENOL) 500 MG tablet Take 1,000 mg by mouth 3 times daily     apixaban ANTICOAGULANT (ELIQUIS) 5 MG tablet Take 10 mg by mouth 2 times daily 10 mg BID X 12 doses, then 5 mg BID X 3 months.     melatonin 3 MG tablet Take 3 mg by mouth at bedtime     memantine (NAMENDA) 5 MG tablet Take 5 mg by mouth 2 times daily     No current  facility-administered medications for this visit.       REVIEW OF SYSTEMS:   10 point review of systems reviewed and pertinent positives in the HPI.     PHYSICAL EXAMINATION:  Physical Exam     Vital signs: /64, pulse 79, respirations 18, temperature 98.4, O2 sat 95% on room air.    General: Awake, Alert, oriented x3, lying in bed, conversant.  HEENT: Moist oral mucosa.  NECK: Supple  CVS:  S1  S2, without murmur or gallop.   LUNG: Shallow respiratory effort.   BACK: No kyphosis of the thoracic spine.  Brace on.  ABDOMEN: Soft, thin, with positive bowel sounds  EXTREMITIES: Moves all extremities with generalized weakness.  No lower extremity edema.  SKIN: Warm and dry  NEUROLOGIC: Intact  PSYCHIATRIC: Cognitive impairment noted.    This note has been dictated using voice recognition software. Any grammatical or context distortions are unintentional and inherent to the software    Electronically signed by: Nicki Calderon CNP       Sincerely,        Nicki Calderon NP

## 2024-01-17 NOTE — PROGRESS NOTES
[facility]    Code Status:  DNR/DNI   Visit Type:   Chief Complaint   Patient presents with    TCU Follow Up     Facility:  [facility] ([facility]) [[id number]]         HPI: Balbina Fischer is a 83 year old female who I am seeing today for follow up on the TCU. Pt recently hospitalized at [facility] on 12/27/23- 1/3/24.  Past medical history includes PE on apixaban, dementia, urinary continence, osteoporosis, dysphagia, hyperlipidemia with recurrent falls.  Pt admitted status post mechanical fall.  Patient had a ground-level fall outside her primary care office.  She was walking out of the clinic with her  when she fell and hit her head on the wall.  She endorsed back and neck pain.  Patient found to have an acute L1 burst fracture with mild height loss 4 mm retropulsion.  She had a right parietal scalp with swelling with no fracture.  Bilateral rib fractures likely subacute with age-indeterminate right C4 spinous process fracture. Patient with multiple recurrent falls at home.    Transitional Care Course: Today patient lying in bed. Her  is present on exam.  L1 burst fx. pain control with Tylenol.  Patient moving better.  Denies any neck pain.  Patient eating well.  No shortness breath or chest pain.       Assessment/Plan:       Closed compression fracture of L1 lumbar vertebra, sequela  Closed fracture of multiple ribs of right side with routine healing, subsequent encounter  -splinting OOB.   -tylenol to 1000 mg TID.   -Follow up with Neurosurgery in 5 weeks with repeat imaging.     Other closed displaced fracture of fourth cervical vertebra, sequela  -age indeterminate.     Dementia without behavioral disturbance (H)  -continue namenda.     Dysphagia, unspecified type  -ST following.   -Regular diet with honey thick liquids.   -incidental diagnosis of esophageal wall thickening.  Recommended barium swallow outpatient.    History of pulmonary embolism  -Continue  apixaban.    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    Dysphagia 01/03/2024    ESTRADA (dyspnea on exertion) 03/08/2023    Osteoporosis 01/03/2024    Macular pucker, left eye 08/05/2013    Hyperlipidemia 09/19/2014    Pulmonary embolism (H) 12/12/2022    Other chronic pulmonary embolism with acute cor pulmonale (H) 01/30/2023    Peripheral sensory neuropathy 09/13/2017    Paresthesia 05/13/2014    Right sided sciatica 08/05/2013    Rosacea 01/03/2024    Urinary incontinence 12/28/2023    Hx of falling 12/28/2023    History of rib fracture 12/28/2023    History of pulmonary embolism 12/28/2023    Ground-level fall 12/28/2023    Closed compression fracture of body of L1 vertebra (H) 12/28/2023    Chronic constipation 12/31/2023    Anemia 12/28/2023    Acute deep vein thrombosis (DVT) of femoral vein (H) 01/01/2024     Resolved Ambulatory Problems     Diagnosis Date Noted    No Resolved Ambulatory Problems     No Additional Past Medical History     Allergies   Allergen Reactions    Ibandronic Acid Other (See Comments)     Jaw osteonecrosis       All Meds and Allergies reviewed in the record at the facility and is the most up-to-date.    Current Outpatient Medications   Medication Sig    acetaminophen (TYLENOL) 500 MG tablet Take 1,000 mg by mouth 3 times daily    apixaban ANTICOAGULANT (ELIQUIS) 5 MG tablet Take 10 mg by mouth 2 times daily 10 mg BID X 12 doses, then 5 mg BID X 3 months.    melatonin 3 MG tablet Take 3 mg by mouth at bedtime    memantine (NAMENDA) 5 MG tablet Take 5 mg by mouth 2 times daily     No current facility-administered medications for this visit.       REVIEW OF SYSTEMS:   10 point review of systems reviewed and  pertinent positives in the HPI.     PHYSICAL EXAMINATION:  Physical Exam     Vital signs: /64, pulse 79, respirations 18, temperature 98.4, O2 sat 95% on room air.    General: Awake, Alert, oriented x3, lying in bed, conversant.  HEENT: Moist oral mucosa.  NECK: Supple  CVS:  S1  S2, without murmur or gallop.   LUNG: Shallow respiratory effort.   BACK: No kyphosis of the thoracic spine.  Brace on.  ABDOMEN: Soft, thin, with positive bowel sounds  EXTREMITIES: Moves all extremities with generalized weakness.  No lower extremity edema.  SKIN: Warm and dry  NEUROLOGIC: Intact  PSYCHIATRIC: Cognitive impairment noted.    This note has been dictated using voice recognition software. Any grammatical or context distortions are unintentional and inherent to the software    Electronically signed by: Nicki Calderon CNP

## 2024-01-18 ENCOUNTER — TRANSITIONAL CARE UNIT VISIT (OUTPATIENT)
Dept: GERIATRICS | Facility: CLINIC | Age: 84
End: 2024-01-18
Payer: COMMERCIAL

## 2024-01-18 VITALS
HEART RATE: 96 BPM | OXYGEN SATURATION: 93 % | RESPIRATION RATE: 18 BRPM | SYSTOLIC BLOOD PRESSURE: 122 MMHG | TEMPERATURE: 98 F | DIASTOLIC BLOOD PRESSURE: 75 MMHG | HEIGHT: 61 IN | WEIGHT: 100.6 LBS | BODY MASS INDEX: 18.99 KG/M2

## 2024-01-18 DIAGNOSIS — F03.90 DEMENTIA WITHOUT BEHAVIORAL DISTURBANCE (H): ICD-10-CM

## 2024-01-18 DIAGNOSIS — Z86.711 HISTORY OF PULMONARY EMBOLISM: ICD-10-CM

## 2024-01-18 DIAGNOSIS — S22.41XD CLOSED FRACTURE OF MULTIPLE RIBS OF RIGHT SIDE WITH ROUTINE HEALING, SUBSEQUENT ENCOUNTER: ICD-10-CM

## 2024-01-18 DIAGNOSIS — R13.10 DYSPHAGIA, UNSPECIFIED TYPE: ICD-10-CM

## 2024-01-18 DIAGNOSIS — S12.390S: ICD-10-CM

## 2024-01-18 DIAGNOSIS — S32.010S CLOSED COMPRESSION FRACTURE OF L1 LUMBAR VERTEBRA, SEQUELA: Primary | ICD-10-CM

## 2024-01-18 PROCEDURE — 99309 SBSQ NF CARE MODERATE MDM 30: CPT | Performed by: NURSE PRACTITIONER

## 2024-01-18 NOTE — LETTER
1/18/2024        RE: Balbina Fischer  [address]  [facility] [address]        [facility]    Code Status:  DNR/DNI   Visit Type:   Chief Complaint   Patient presents with     TCU Follow Up     Facility:  [facility] ([facility]) [[id number]]         HPI: Balbina Fischer is a 83 year old female who I am seeing today for follow up on the TCU. Pt recently hospitalized at [facility] on 12/27/23- 1/3/24.  Past medical history includes PE on apixaban, dementia, urinary continence, osteoporosis, dysphagia, hyperlipidemia with recurrent falls.  Pt admitted status post mechanical fall.  Patient had a ground-level fall outside her primary care office.  She was walking out of the clinic with her  when she fell and hit her head on the wall.  She endorsed back and neck pain.  Patient found to have an acute L1 burst fracture with mild height loss 4 mm retropulsion.  She had a right parietal scalp with swelling with no fracture.  Bilateral rib fractures likely subacute with age-indeterminate right C4 spinous process fracture. Patient with multiple recurrent falls at home.    Transitional Care Course: Today patient lying in bed. Her  is present on exam.  L1 burst fx. pain control with Tylenol. Continues in splinting OOB. Denies any pain on visit. Dysphagia.  Patient eating well. Continues on thickened liquids. Followed by ST.  No shortness breath or chest pain.       Assessment/Plan:       Closed compression fracture of L1 lumbar vertebra, sequela  Closed fracture of multiple ribs of right side with routine healing, subsequent encounter  -splinting OOB.   -tylenol to 1000 mg TID.   -Follow up with Neurosurgery in 4 weeks with repeat imaging.     Other closed displaced fracture of fourth cervical vertebra, sequela  -age indeterminate.     Dementia without behavioral disturbance (H)  -continue namenda.     Dysphagia, unspecified type  -ST following.   -Regular diet with honey thick  liquids.   -incidental diagnosis of esophageal wall thickening.  Recommended barium swallow outpatient.  -Follow up FEEs test on Monday.     History of pulmonary embolism  -Continue apixaban.    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     Dysphagia 01/03/2024     ESTRADA (dyspnea on exertion) 03/08/2023     Osteoporosis 01/03/2024     Macular pucker, left eye 08/05/2013     Hyperlipidemia 09/19/2014     Pulmonary embolism (H) 12/12/2022     Other chronic pulmonary embolism with acute cor pulmonale (H) 01/30/2023     Peripheral sensory neuropathy 09/13/2017     Paresthesia 05/13/2014     Right sided sciatica 08/05/2013     Rosacea 01/03/2024     Urinary incontinence 12/28/2023     Hx of falling 12/28/2023     History of rib fracture 12/28/2023     History of pulmonary embolism 12/28/2023     Ground-level fall 12/28/2023     Closed compression fracture of body of L1 vertebra (H) 12/28/2023     Chronic constipation 12/31/2023     Anemia 12/28/2023     Acute deep vein thrombosis (DVT) of femoral vein (H) 01/01/2024     Resolved Ambulatory Problems     Diagnosis Date Noted     No Resolved Ambulatory Problems     No Additional Past Medical History     Allergies   Allergen Reactions     Ibandronic Acid Other (See Comments)     Jaw osteonecrosis       All Meds and Allergies reviewed in the record at the facility and is the most up-to-date.    Current Outpatient Medications   Medication Sig     acetaminophen (TYLENOL) 500 MG tablet Take 1,000 mg by mouth 3 times daily     apixaban ANTICOAGULANT (ELIQUIS) 5 MG tablet Take 10 mg by mouth 2 times daily 10 mg BID X 12 doses, then 5 mg BID X 3 months.     melatonin 3 MG tablet Take 3 mg by  mouth at bedtime     memantine (NAMENDA) 5 MG tablet Take 5 mg by mouth 2 times daily     No current facility-administered medications for this visit.       REVIEW OF SYSTEMS:   10 point review of systems reviewed and pertinent positives in the HPI.     PHYSICAL EXAMINATION:  Physical Exam     Vital signs: /64, pulse 79, respirations 18, temperature 98.4, O2 sat 95% on room air.    General: Awake, Alert, oriented x3, lying in bed, conversant.  HEENT: Moist oral mucosa.  NECK: Supple  CVS:  S1  S2, without murmur or gallop.   LUNG: Shallow respiratory effort.   BACK: No kyphosis of the thoracic spine.  Brace on.  ABDOMEN: Soft, thin, with positive bowel sounds  EXTREMITIES: Moves all extremities with generalized weakness.  No lower extremity edema.  SKIN: Warm and dry  NEUROLOGIC: Intact  PSYCHIATRIC: Cognitive impairment noted.    This note has been dictated using voice recognition software. Any grammatical or context distortions are unintentional and inherent to the software    Electronically signed by: Nicki Calderon CNP       Sincerely,        Nicki Calderon NP

## 2024-01-18 NOTE — PROGRESS NOTES
[facility]    Code Status:  DNR/DNI   Visit Type:   Chief Complaint   Patient presents with    TCU Follow Up     Facility:  [facility] ([facility]) [[id number]]         HPI: Balbina Fischer is a 83 year old female who I am seeing today for follow up on the TCU. Pt recently hospitalized at [facility] on 12/27/23- 1/3/24.  Past medical history includes PE on apixaban, dementia, urinary continence, osteoporosis, dysphagia, hyperlipidemia with recurrent falls.  Pt admitted status post mechanical fall.  Patient had a ground-level fall outside her primary care office.  She was walking out of the clinic with her  when she fell and hit her head on the wall.  She endorsed back and neck pain.  Patient found to have an acute L1 burst fracture with mild height loss 4 mm retropulsion.  She had a right parietal scalp with swelling with no fracture.  Bilateral rib fractures likely subacute with age-indeterminate right C4 spinous process fracture. Patient with multiple recurrent falls at home.    Transitional Care Course: Today patient lying in bed. Her  is present on exam.  L1 burst fx. pain control with Tylenol. Continues in splinting OOB. Denies any pain on visit. Dysphagia.  Patient eating well. Continues on thickened liquids. Followed by ST.  No shortness breath or chest pain.       Assessment/Plan:       Closed compression fracture of L1 lumbar vertebra, sequela  Closed fracture of multiple ribs of right side with routine healing, subsequent encounter  -splinting OOB.   -tylenol to 1000 mg TID.   -Follow up with Neurosurgery in 4 weeks with repeat imaging.     Other closed displaced fracture of fourth cervical vertebra, sequela  -age indeterminate.     Dementia without behavioral disturbance (H)  -continue namenda.     Dysphagia, unspecified type  -ST following.   -Regular diet with honey thick liquids.   -incidental diagnosis of esophageal wall thickening.  Recommended barium swallow  outpatient.  -Follow up FEEs test on Monday.     History of pulmonary embolism  -Continue apixaban.    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    Dysphagia 01/03/2024    ESTRADA (dyspnea on exertion) 03/08/2023    Osteoporosis 01/03/2024    Macular pucker, left eye 08/05/2013    Hyperlipidemia 09/19/2014    Pulmonary embolism (H) 12/12/2022    Other chronic pulmonary embolism with acute cor pulmonale (H) 01/30/2023    Peripheral sensory neuropathy 09/13/2017    Paresthesia 05/13/2014    Right sided sciatica 08/05/2013    Rosacea 01/03/2024    Urinary incontinence 12/28/2023    Hx of falling 12/28/2023    History of rib fracture 12/28/2023    History of pulmonary embolism 12/28/2023    Ground-level fall 12/28/2023    Closed compression fracture of body of L1 vertebra (H) 12/28/2023    Chronic constipation 12/31/2023    Anemia 12/28/2023    Acute deep vein thrombosis (DVT) of femoral vein (H) 01/01/2024     Resolved Ambulatory Problems     Diagnosis Date Noted    No Resolved Ambulatory Problems     No Additional Past Medical History     Allergies   Allergen Reactions    Ibandronic Acid Other (See Comments)     Jaw osteonecrosis       All Meds and Allergies reviewed in the record at the facility and is the most up-to-date.    Current Outpatient Medications   Medication Sig    acetaminophen (TYLENOL) 500 MG tablet Take 1,000 mg by mouth 3 times daily    apixaban ANTICOAGULANT (ELIQUIS) 5 MG tablet Take 10 mg by mouth 2 times daily 10 mg BID X 12 doses, then 5 mg BID X 3 months.    melatonin 3 MG tablet Take 3 mg by mouth at bedtime    memantine (NAMENDA) 5 MG tablet Take 5 mg by mouth 2 times daily     No current facility-administered  medications for this visit.       REVIEW OF SYSTEMS:   10 point review of systems reviewed and pertinent positives in the HPI.     PHYSICAL EXAMINATION:  Physical Exam     Vital signs: /64, pulse 79, respirations 18, temperature 98.4, O2 sat 95% on room air.    General: Awake, Alert, oriented x3, lying in bed, conversant.  HEENT: Moist oral mucosa.  NECK: Supple  CVS:  S1  S2, without murmur or gallop.   LUNG: Shallow respiratory effort.   BACK: No kyphosis of the thoracic spine.  Brace on.  ABDOMEN: Soft, thin, with positive bowel sounds  EXTREMITIES: Moves all extremities with generalized weakness.  No lower extremity edema.  SKIN: Warm and dry  NEUROLOGIC: Intact  PSYCHIATRIC: Cognitive impairment noted.    This note has been dictated using voice recognition software. Any grammatical or context distortions are unintentional and inherent to the software    Electronically signed by: Nicki Calderon CNP

## 2024-01-23 ENCOUNTER — TRANSITIONAL CARE UNIT VISIT (OUTPATIENT)
Dept: GERIATRICS | Facility: CLINIC | Age: 84
End: 2024-01-23
Payer: COMMERCIAL

## 2024-01-23 VITALS
HEIGHT: 61 IN | DIASTOLIC BLOOD PRESSURE: 82 MMHG | OXYGEN SATURATION: 95 % | BODY MASS INDEX: 20.05 KG/M2 | WEIGHT: 106.2 LBS | HEART RATE: 103 BPM | TEMPERATURE: 98.3 F | SYSTOLIC BLOOD PRESSURE: 134 MMHG | RESPIRATION RATE: 20 BRPM

## 2024-01-23 DIAGNOSIS — S32.010S CLOSED COMPRESSION FRACTURE OF L1 LUMBAR VERTEBRA, SEQUELA: Primary | ICD-10-CM

## 2024-01-23 DIAGNOSIS — R13.10 DYSPHAGIA, UNSPECIFIED TYPE: ICD-10-CM

## 2024-01-23 DIAGNOSIS — S22.41XD CLOSED FRACTURE OF MULTIPLE RIBS OF RIGHT SIDE WITH ROUTINE HEALING, SUBSEQUENT ENCOUNTER: ICD-10-CM

## 2024-01-23 DIAGNOSIS — F03.90 DEMENTIA WITHOUT BEHAVIORAL DISTURBANCE (H): ICD-10-CM

## 2024-01-23 DIAGNOSIS — Z86.711 HISTORY OF PULMONARY EMBOLISM: ICD-10-CM

## 2024-01-23 DIAGNOSIS — S12.390S: ICD-10-CM

## 2024-01-23 PROCEDURE — 99316 NF DSCHRG MGMT 30 MIN+: CPT | Performed by: NURSE PRACTITIONER

## 2024-01-23 NOTE — LETTER
1/23/2024        RE: Balbina Fischer  [address]  [facility] [address]        [facility]    Code Status:  DNR/DNI   Visit Type:   Chief Complaint   Patient presents with     Discharge from TCU     Facility:  [facility] ([facility]) [[id number]]         HPI: Balbina Fischer is a 83 year old female who I am seeing today for discharge from the TCU. Pt recently hospitalized at [facility] on 12/27/23- 1/3/24.  Past medical history includes PE on apixaban, dementia, urinary continence, osteoporosis, dysphagia, hyperlipidemia with recurrent falls.  Pt admitted status post mechanical fall.  Patient had a ground-level fall outside her primary care office.  She was walking out of the clinic with her  when she fell and hit her head on the wall.  She endorsed back and neck pain.  Patient found to have an acute L1 burst fracture with mild height loss 4 mm retropulsion.  She had a right parietal scalp with swelling with no fracture.  Bilateral rib fractures likely subacute with age-indeterminate right C4 spinous process fracture. Patient with multiple recurrent falls at home.    Transitional Care Course: Today patient sitting up in wheelchair.. Her  is present on exam.  L1 burst fx. pain control with Tylenol. Continues in splinting OOB. Denies any pain on visit.  No neck pain.  Dysphagia.  Patient eating well. Continues on thickened liquids. Followed by ST. No shortness breath or chest pain.  Today patient  is upset about having to take patient home with 24-hour care.  He reports he does have long-term care insurance.  The  did review at a care conference possible SANJUANITA however family blind.      Assessment/Plan:       Closed compression fracture of L1 lumbar vertebra, sequela  Closed fracture of multiple ribs of right side with routine healing, subsequent encounter  -splinting OOB.   -tylenol to 1000 mg TID.   -Follow up with Neurosurgery in 3 weeks with  repeat imaging.     Other closed displaced fracture of fourth cervical vertebra, sequela  -age indeterminate.     Dementia without behavioral disturbance (H)  -continue namenda.     Dysphagia, unspecified type  -ST following.   -Regular diet with honey thick liquids.   -incidental diagnosis of esophageal wall thickening.  Recommended barium swallow outpatient.  -Follow up FEEs test on 1/21/24 showed no aspiration or penetration across trials indicating adequate airway protection.  Patient upgraded to thin liquids.  Speech recommends small bites/sips to promote safe intake.    History of pulmonary embolism  -Continue apixaban.    -Okay to DC home with current meds and treatment.  -Home PT, OT, home aide and RN for medication management.  -Follow-up with primary care provider 1 to 2 weeks.    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     Dysphagia 01/03/2024     ESTRADA (dyspnea on exertion) 03/08/2023     Osteoporosis 01/03/2024     Macular pucker, left eye 08/05/2013     Hyperlipidemia 09/19/2014     Pulmonary embolism (H) 12/12/2022     Other chronic pulmonary embolism with acute cor pulmonale (H) 01/30/2023     Peripheral sensory neuropathy 09/13/2017     Paresthesia 05/13/2014     Right sided sciatica 08/05/2013     Rosacea 01/03/2024     Urinary incontinence 12/28/2023     Hx of falling 12/28/2023     History of rib fracture 12/28/2023     History of pulmonary embolism 12/28/2023     Ground-level fall 12/28/2023     Closed compression fracture of body of L1 vertebra (H) 12/28/2023     Chronic constipation 12/31/2023     Anemia 12/28/2023     Acute deep vein thrombosis (DVT) of femoral vein (H) 01/01/2024     Resolved  Ambulatory Problems     Diagnosis Date Noted     No Resolved Ambulatory Problems     No Additional Past Medical History     Allergies   Allergen Reactions     Ibandronic Acid Other (See Comments)     Jaw osteonecrosis       All Meds and Allergies reviewed in the record at the facility and is the most up-to-date.    Current Outpatient Medications   Medication Sig     acetaminophen (TYLENOL) 500 MG tablet Take 1,000 mg by mouth 3 times daily     apixaban ANTICOAGULANT (ELIQUIS) 5 MG tablet Take 10 mg by mouth 2 times daily 10 mg BID X 12 doses, then 5 mg BID X 3 months.     melatonin 3 MG tablet Take 3 mg by mouth at bedtime     memantine (NAMENDA) 5 MG tablet Take 5 mg by mouth 2 times daily     No current facility-administered medications for this visit.       REVIEW OF SYSTEMS:   10 point review of systems reviewed and pertinent positives in the HPI.     PHYSICAL EXAMINATION:  Physical Exam     Vital signs: /64, pulse 79, respirations 18, temperature 98.4, O2 sat 95% on room air.    General: Awake, Alert, oriented x3, lying in bed, conversant.  HEENT: Moist oral mucosa.  NECK: Supple  CVS:  S1  S2, without murmur or gallop.   LUNG: Shallow respiratory effort.   BACK: No kyphosis of the thoracic spine.  Brace on.  ABDOMEN: Soft, thin, with positive bowel sounds  EXTREMITIES: Moves all extremities with generalized weakness.  No lower extremity edema.  SKIN: Warm and dry  NEUROLOGIC: Intact  PSYCHIATRIC: Cognitive impairment noted.      DISCHARGE PLAN/FACE TO FACE:  I certify that this patient is under my care and that I, or a nurse practitioner or physician's assistant working with me, had a face-to-face encounter that meets the physician face-to-face encounter requirements with this patient.       I certify that, based on my findings, the following services are medically necessary home health services.    My clinical findings support the need for the above skilled services.    This patient is homebound  because: Recent fall with L1 burst fracture.    The patient is, or has been, under my care and I have initiated the establishment of the plan of care. This patient will be followed by a physician who will periodically review the plan of care.    35 minutes spent reviewing discharge medications, home care services and follow-ups as well as need for 24-hour care with patient and her .  Time also spent collaborating with the .    This note has been dictated using voice recognition software. Any grammatical or context distortions are unintentional and inherent to the software    Electronically signed by: Nicki Calderon CNP       Sincerely,        Nicki Calderon NP

## 2024-01-24 NOTE — PROGRESS NOTES
[facility]    Code Status:  DNR/DNI   Visit Type:   Chief Complaint   Patient presents with    Discharge from TCU     Facility:  [facility] ([facility]) [[id number]]         HPI: Balbina Fischer is a 83 year old female who I am seeing today for discharge from the TCU. Pt recently hospitalized at [facility] on 12/27/23- 1/3/24.  Past medical history includes PE on apixaban, dementia, urinary continence, osteoporosis, dysphagia, hyperlipidemia with recurrent falls.  Pt admitted status post mechanical fall.  Patient had a ground-level fall outside her primary care office.  She was walking out of the clinic with her  when she fell and hit her head on the wall.  She endorsed back and neck pain.  Patient found to have an acute L1 burst fracture with mild height loss 4 mm retropulsion.  She had a right parietal scalp with swelling with no fracture.  Bilateral rib fractures likely subacute with age-indeterminate right C4 spinous process fracture. Patient with multiple recurrent falls at home.    Transitional Care Course: Today patient sitting up in wheelchair.. Her  is present on exam.  L1 burst fx. pain control with Tylenol. Continues in splinting OOB. Denies any pain on visit.  No neck pain.  Dysphagia.  Patient eating well. Continues on thickened liquids. Followed by ST. No shortness breath or chest pain.  Today patient  is upset about having to take patient home with 24-hour care.  He reports he does have long-term care insurance.  The  did review at a care conference possible [facility]JUANITA however family blind.      Assessment/Plan:       Closed compression fracture of L1 lumbar vertebra, sequela  Closed fracture of multiple ribs of right side with routine healing, subsequent encounter  -splinting OOB.   -tylenol to 1000 mg TID.   -Follow up with Neurosurgery in 3 weeks with repeat imaging.     Other closed displaced fracture of fourth cervical vertebra, sequela  -age  indeterminate.     Dementia without behavioral disturbance (H)  -continue namenda.     Dysphagia, unspecified type  -ST following.   -Regular diet with honey thick liquids.   -incidental diagnosis of esophageal wall thickening.  Recommended barium swallow outpatient.  -Follow up FEEs test on 1/21/24 showed no aspiration or penetration across trials indicating adequate airway protection.  Patient upgraded to thin liquids.  Speech recommends small bites/sips to promote safe intake.    History of pulmonary embolism  -Continue apixaban.    -Okay to DC home with current meds and treatment.  -Home PT, OT, home aide and RN for medication management.  -Follow-up with primary care provider 1 to 2 weeks.    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    Dysphagia 01/03/2024    ESTRADA (dyspnea on exertion) 03/08/2023    Osteoporosis 01/03/2024    Macular pucker, left eye 08/05/2013    Hyperlipidemia 09/19/2014    Pulmonary embolism (H) 12/12/2022    Other chronic pulmonary embolism with acute cor pulmonale (H) 01/30/2023    Peripheral sensory neuropathy 09/13/2017    Paresthesia 05/13/2014    Right sided sciatica 08/05/2013    Rosacea 01/03/2024    Urinary incontinence 12/28/2023    Hx of falling 12/28/2023    History of rib fracture 12/28/2023    History of pulmonary embolism 12/28/2023    Ground-level fall 12/28/2023    Closed compression fracture of body of L1 vertebra (H) 12/28/2023    Chronic constipation 12/31/2023    Anemia 12/28/2023    Acute deep vein thrombosis (DVT) of femoral vein (H) 01/01/2024     Resolved Ambulatory Problems     Diagnosis Date Noted    No Resolved Ambulatory Problems     No Additional Past Medical History      Allergies   Allergen Reactions    Ibandronic Acid Other (See Comments)     Jaw osteonecrosis       All Meds and Allergies reviewed in the record at the facility and is the most up-to-date.    Current Outpatient Medications   Medication Sig    acetaminophen (TYLENOL) 500 MG tablet Take 1,000 mg by mouth 3 times daily    apixaban ANTICOAGULANT (ELIQUIS) 5 MG tablet Take 10 mg by mouth 2 times daily 10 mg BID X 12 doses, then 5 mg BID X 3 months.    melatonin 3 MG tablet Take 3 mg by mouth at bedtime    memantine (NAMENDA) 5 MG tablet Take 5 mg by mouth 2 times daily     No current facility-administered medications for this visit.       REVIEW OF SYSTEMS:   10 point review of systems reviewed and pertinent positives in the HPI.     PHYSICAL EXAMINATION:  Physical Exam     Vital signs: /64, pulse 79, respirations 18, temperature 98.4, O2 sat 95% on room air.    General: Awake, Alert, oriented x3, lying in bed, conversant.  HEENT: Moist oral mucosa.  NECK: Supple  CVS:  S1  S2, without murmur or gallop.   LUNG: Shallow respiratory effort.   BACK: No kyphosis of the thoracic spine.  Brace on.  ABDOMEN: Soft, thin, with positive bowel sounds  EXTREMITIES: Moves all extremities with generalized weakness.  No lower extremity edema.  SKIN: Warm and dry  NEUROLOGIC: Intact  PSYCHIATRIC: Cognitive impairment noted.      DISCHARGE PLAN/FACE TO FACE:  I certify that this patient is under my care and that I, or a nurse practitioner or physician's assistant working with me, had a face-to-face encounter that meets the physician face-to-face encounter requirements with this patient.       I certify that, based on my findings, the following services are medically necessary home health services.    My clinical findings support the need for the above skilled services.    This patient is homebound because: Recent fall with L1 burst fracture.    The patient is, or has been, under my care and I have initiated the establishment of  the plan of care. This patient will be followed by a physician who will periodically review the plan of care.    35 minutes spent reviewing discharge medications, home care services and follow-ups as well as need for 24-hour care with patient and her .  Time also spent collaborating with the .    This note has been dictated using voice recognition software. Any grammatical or context distortions are unintentional and inherent to the software    Electronically signed by: Nicki Calderon CNP